# Patient Record
Sex: MALE | Employment: UNEMPLOYED | URBAN - METROPOLITAN AREA
[De-identification: names, ages, dates, MRNs, and addresses within clinical notes are randomized per-mention and may not be internally consistent; named-entity substitution may affect disease eponyms.]

---

## 2017-06-22 ENCOUNTER — HOSPITAL ENCOUNTER (EMERGENCY)
Age: 52
Discharge: LEFT BEFORE DISCHARGE | End: 2017-06-22
Attending: EMERGENCY MEDICINE
Payer: SELF-PAY

## 2017-06-22 VITALS
SYSTOLIC BLOOD PRESSURE: 181 MMHG | TEMPERATURE: 98.7 F | DIASTOLIC BLOOD PRESSURE: 90 MMHG | OXYGEN SATURATION: 100 % | RESPIRATION RATE: 18 BRPM | HEART RATE: 72 BPM

## 2017-06-22 DIAGNOSIS — H54.61 VISION LOSS, RIGHT EYE: Primary | ICD-10-CM

## 2017-06-22 PROCEDURE — 99283 EMERGENCY DEPT VISIT LOW MDM: CPT

## 2017-06-22 NOTE — ED PROVIDER NOTES
HPI Comments: 12:40 PM Yulisa Pike is a 50 y.o. male who presents to the ED c/o right eye vision loss that started two days ago. He has had some occasional right eye pain since his sx started. He was seen at [facility] today and was sent to the ED. He has no hx of diabetes, hx of HTN, hx of tobacco use-, numbness, HA, and any further complaints. The history is provided by the patient and the spouse. History reviewed. No pertinent past medical history. History reviewed. No pertinent surgical history. History reviewed. No pertinent family history. Social History     Social History    Marital status:      Spouse name: N/A    Number of children: N/A    Years of education: N/A     Occupational History    Not on file. Social History Main Topics    Smoking status: Not on file    Smokeless tobacco: Not on file    Alcohol use Not on file    Drug use: Not on file    Sexual activity: Not on file     Other Topics Concern    Not on file     Social History Narrative    No narrative on file         ALLERGIES: Review of patient's allergies indicates no known allergies. Review of Systems   Constitutional: Negative for diaphoresis and fever. HENT: Negative for congestion and sore throat. Eyes: Positive for pain and visual disturbance. Negative for itching. Respiratory: Negative for cough and shortness of breath. Cardiovascular: Negative for chest pain and palpitations. Gastrointestinal: Negative for abdominal pain and diarrhea. Endocrine: Negative for polydipsia and polyuria. Genitourinary: Negative for dysuria and hematuria. Musculoskeletal: Negative for arthralgias and myalgias. Skin: Negative for rash and wound. Neurological: Negative for seizures, syncope and numbness. Hematological: Does not bruise/bleed easily. Psychiatric/Behavioral: Negative for agitation and hallucinations.        Vitals:    06/22/17 1240   BP: 181/90   Pulse: 72   Resp: 18 Temp: 98.7 °F (37.1 °C)   SpO2: 100%            Physical Exam   Constitutional: He appears well-developed and well-nourished. HENT:   Head: Normocephalic and atraumatic. Right eye, no red reflex, see grey. Pt counts fingers at 5 feet. Cannot read chart at 20 ft or 6 feet with card. No field cuts. Eyes: Conjunctivae are normal. Pupils are equal, round, and reactive to light. No scleral icterus. Neck: Normal range of motion. Neck supple. No JVD present. Cardiovascular: Normal rate and regular rhythm. 4 intact extremity pulses   Pulmonary/Chest: Effort normal. No respiratory distress. Musculoskeletal: Normal range of motion. Lymphadenopathy:     He has no cervical adenopathy. Neurological: He is alert. Skin: Skin is warm and dry. Nursing note and vitals reviewed. Concern for vitreous hemorrhage, retinal detachment, vein occlusion. [facility]  ED Course   12:50 PM paging optho at [facility] general.  We don't have optho coverage. With right eye counts fingers at 5 ft.     1:45 pm no call from optho. d/w [facility] transfer center, waiting on call back    Consult:  Discussed care with Lakhwinder Diehl, Specialty: Emergency Medicine  Standard discussion; including history of patients chief complaint, available diagnostic results, and treatment course. He agrees on transfer to [address] [place] [address]  2:27 PM, 06/22/17   Procedures     Vitals:  Patient Vitals for the past 12 hrs:   Temp Pulse Resp BP SpO2   06/22/17 1240 98.7 °F (37.1 °C) 72 18 181/90 100 %   Pulse ox reviewed and WNL    Medications ordered:   Medications - No data to display      Lab findings:  No results found for this or any previous visit (from the past 12 hour(s)). X-Ray, CT or other radiology findings or impressions:  No orders to display       Progress notes, Consult notes or additional Procedure notes:       Disposition:  Diagnosis:   1.  Vision loss, right eye      Pt refuses ambulance transport, per [facility] [employer] this is against medical advise. Disposition: AMA    Follow-up Information     None           Patient's Medications    No medications on file       SCRIBE ATTESTATION STATEMENT  Documented by: Henna Pressley for, and in the presence of, Ashwini Ornelas MD 2:31 PM     Signed by: Christopher Han, 06/22/17 2:31 PM     PROVIDER ATTESTATION STATEMENT  I personally performed the services described in the documentation, reviewed the documentation, as recorded by the scribe in my presence, and it accurately and completely records my words and actions.   Ashwini Ornelas MD

## 2017-06-22 NOTE — ED NOTES
I have reviewed discharge instructions with the patient. The patient verbalized understanding. Pt signed AMA paperwork prior to leaving ER. MD explained AMA paperwork to pt and wife and signed document as well.

## 2017-06-22 NOTE — DISCHARGE INSTRUCTIONS
Go to [facility] general emergency room, discussed with Dr Garrett Hawthorne

## 2018-09-24 PROBLEM — R33.9 URINARY RETENTION: Status: ACTIVE | Noted: 2018-09-24
